# Patient Record
Sex: MALE | Race: WHITE | NOT HISPANIC OR LATINO | Employment: FULL TIME | URBAN - METROPOLITAN AREA
[De-identification: names, ages, dates, MRNs, and addresses within clinical notes are randomized per-mention and may not be internally consistent; named-entity substitution may affect disease eponyms.]

---

## 2024-03-26 ENCOUNTER — OFFICE VISIT (OUTPATIENT)
Age: 34
End: 2024-03-26
Payer: COMMERCIAL

## 2024-03-26 VITALS
DIASTOLIC BLOOD PRESSURE: 88 MMHG | HEART RATE: 73 BPM | WEIGHT: 193.12 LBS | OXYGEN SATURATION: 98 % | BODY MASS INDEX: 27.04 KG/M2 | HEIGHT: 71 IN | RESPIRATION RATE: 18 BRPM | SYSTOLIC BLOOD PRESSURE: 118 MMHG | TEMPERATURE: 97.1 F

## 2024-03-26 DIAGNOSIS — H10.32 ACUTE CONJUNCTIVITIS OF LEFT EYE, UNSPECIFIED ACUTE CONJUNCTIVITIS TYPE: Primary | ICD-10-CM

## 2024-03-26 PROCEDURE — 99203 OFFICE O/P NEW LOW 30 MIN: CPT | Performed by: PHYSICIAN ASSISTANT

## 2024-03-26 RX ORDER — TOBRAMYCIN 3 MG/ML
1 SOLUTION/ DROPS OPHTHALMIC
Qty: 5 ML | Refills: 0 | Status: SHIPPED | OUTPATIENT
Start: 2024-03-26

## 2024-03-26 NOTE — PROGRESS NOTES
[facility]        NAME: Gaurav Reyez is a 33 y.o. male  : 1990    MRN: [id number]  DATE: 2024  TIME: 6:01 PM    Assessment and Plan   Acute conjunctivitis of left eye, unspecified acute conjunctivitis type [H10.32]  1. Acute conjunctivitis of left eye, unspecified acute conjunctivitis type  tobramycin (TOBREX) 0.3 % SOLN            Patient Instructions     Patient has left eye conjunctivitis.  He does not have any sign of corneal abrasion or ulceration or any foreign body on exam.  I prescribed him topical antibiotic drops.  Should be reevaluated if his condition persists or worsens despite treatment.  Follow up with PCP in 3-5 days.  Proceed to  ER if symptoms worsen.    If tests have been performed at [facility], our office will contact you with results if changes need to be made to the care plan discussed with you at the visit.  [facility].    Chief Complaint     Chief Complaint   Patient presents with    Eye Problem     Left eye irritation. Wears contacts. Over the weekend he had increased irritation with contact in left eye. This morning he woke up with worse irritation, redness, swelling not wearing contact in left eye currently. Flushed with water. Left side of sinuses are congested, runny nose on left side.         History of Present Illness       Patient presents with onset in the past few days of left eye redness, irritation.  He is not sure if he might have gotten something in his left eye.  He does wear contact lenses.  He denies any active crusting or discharge, visual changes or loss.  He has also had some left-sided runny nose and sinus congestion.  Denies any symptoms in the right eye.  He did try flushing out his left eye.        Review of Systems   Review of Systems   Constitutional: Negative.    Eyes:  Positive for pain (irritation) and redness. Negative for photophobia, discharge, itching and visual disturbance.         "Pertinent positives pertain to left eye   Respiratory: Negative.     Cardiovascular: Negative.    Gastrointestinal: Negative.    Genitourinary: Negative.          Current Medications       Current Outpatient Medications:     tobramycin (TOBREX) 0.3 % SOLN, Administer 1 drop into the left eye every 4 (four) hours while awake, Disp: 5 mL, Rfl: 0    Current Allergies     Allergies as of 03/26/2024    (No Known Allergies)            The following portions of the patient's history were reviewed and updated as appropriate: allergies, current medications, past family history, past medical history, past social history, past surgical history and problem list.     History reviewed. No pertinent past medical history.    History reviewed. No pertinent surgical history.    Family History   Problem Relation Age of Onset    No Known Problems Mother     No Known Problems Father          Medications have been verified.        Objective   /88   Pulse 73   Temp (!) 97.1 °F (36.2 °C)   Resp 18   Ht 5' 11\" (1.803 m)   Wt 87.6 kg (193 lb 2 oz)   SpO2 98%   BMI 26.94 kg/m²   No LMP for male patient.       Physical Exam     Physical Exam  Vitals reviewed.   Constitutional:       General: He is not in acute distress.     Appearance: He is well-developed.   Eyes:      Comments: Left eye with conjunctival injection but no active watering or discharge, crusting on lid margins, lid edema, photophobia, or foreign body noted on flipping of lids or on examination of eye surface.  Tetracaine drop was instilled followed by fluorescein stain and upon further examination, there is no visible corneal abrasion or ulcer noted.  Right eye exam is normal.   Neurological:      Mental Status: He is alert and oriented to person, place, and time.                   "

## 2024-03-26 NOTE — LETTER
March 26, 2024     Patient: Gaurav Reyez   YOB: 1990   Date of Visit: 3/26/2024       To Whom it May Concern:    Gaurav Reyez was seen in my clinic on 3/26/2024. He may return to work on 3/27/2024 .    If you have any questions or concerns, please don't hesitate to call.         Sincerely,          Mg Castaneda PA-C        CC: No Recipients